# Patient Record
Sex: MALE | Race: WHITE | Employment: FULL TIME | URBAN - METROPOLITAN AREA
[De-identification: names, ages, dates, MRNs, and addresses within clinical notes are randomized per-mention and may not be internally consistent; named-entity substitution may affect disease eponyms.]

---

## 2022-09-08 ENCOUNTER — HOSPITAL ENCOUNTER (OUTPATIENT)
Dept: PHYSICAL THERAPY | Age: 42
Discharge: HOME | End: 2022-09-08
Payer: COMMERCIAL

## 2022-09-08 PROCEDURE — 97162 PT EVAL MOD COMPLEX 30 MIN: CPT

## 2022-09-08 NOTE — PROGRESS NOTES
[address] [place] [address] PHYSICAL THERAPY AT [facility] [address]. [place], [address] - Phone: [phone]  Fax: [phone] / [address]  Patient Name: Josemanuel Nieves : 1980   Medical   Diagnosis: Other low back pain [M54.59] Treatment Diagnosis: LBP   Onset Date: ~3/2022     Referral Source: Dorcas Mcgregor MD Start of Care [facility]): 2022   Prior Hospitalization: See medical history Provider #: [id number]   Prior Level of Function: Pt running, working without LBP   Comorbidities: See medical history   Medications: Verified on Patient Summary List     The Plan of Care and following information is based on the information from the initial evaluation.   ===========================================================================================  Assessment / key information:   Josemanuel Nieves is a 38 y.o.  yo male with Dx of Other low back pain [M54.59]. He currently rates LBP as 6/10 at worst, 1/10 at best, primarily located at lower lumbar region bilaterally. He complains of difficulty and increase pain with prolonged sitting, walking, bending. Denies numbness and tingling. Pt states his symptoms are most severe with unsupported sitting and discomfort is always there to some extent. Pt states he is working from home at a desk with two monitors, second monitor to the L. States he has been working this way since pandemic. Objective Findings:  Lumbar ROM: Limited AROM with pain during all lumbar AROM, pt unable to perform segmental motion in lumbar region with exception of L rotation. His flexion and ext are most painful motions. His B hip ER and IR are limited and painful at [facility] in lumbar region. TTP at R lumbar paraspinals which are hypertonic and hypomobility noted in lumbar intersegmental motion at Gr 2. Manual Muscle Testing: B hip ER, abd and ext at 4/5, abd R at 3+/5 L at 4/5.   Special Test:  Slump Test: inconclusive, SLR limited by hamstrings B, 90/90 testing R at -45 deg, L at 40 deg. Pt with significant discomfort with prone positioning, reduced symptoms with PPT in supine. Pt instructed in HEP, lumbar roll and body mechanics and will f/u in clinic for PT. Pt demonstrates signs and symptoms consistent with possible discogenic nature, would benefit from ergonomic follow up and Helio screen at current clinic.    ==================================================================================  Eval Complexity: History MEDIUM  Complexity : 1-2 comorbidities / personal factors will impact the outcome/ POC ;  Examination  LOW Complexity : 1-2 Standardized tests and measures addressing body structure, function, activity limitation and / or participation in recreation ; Presentation LOW Complexity : Stable, uncomplicated ;  Decision Making MEDIUM Complexity : FOTO score of 26-74; Overall Complexity LOW   Problem List: pain affecting function, decrease ROM, decrease strength, impaired gait/ balance, decrease ADL/ functional abilitiies, decrease activity tolerance, decrease flexibility/ joint mobility, and decrease transfer abilities   Treatment Plan may include any combination of the following: Therapeutic exercise, Therapeutic activities, Neuromuscular re-education, Physical agent/modality, Manual therapy, Patient education, Self Care training, Functional mobility training, Home safety training, and Stair training  Patient / Family readiness to learn indicated by: asking questions, trying to perform skills and interest  Persons(s) to be included in education: patient (P)  Barriers to Learning/Limitations: None  Measures taken, if barriers to learning:    Patient Goal (s): To have help with pain, learn exercises to improve pain     Rehabilitation Potential: good  Short Term Goals: To be accomplished in  2 weeks:  1. Independent with HEP for improved ROM, B hip strength to improve ability to perform ADLs.   2. Pt will be educated and verbalize understanding of body mechanics and posture during ADLs and work to improve pain with function. 3. Pt will demonstrate functional and nonpainful lumbar AROM to improve work and ADLs. Long Term Goals: To be accomplished in  4-6  weeks:  1. Pt will demonstrate ability to perform MMT for B hip and core at 4+/5 and fair+ core strength to improve recreational activity. 2.  Improve FOTO score to 78 to improve return to prior level of function, recreational activity without pain. 3.  Will rate a +5 on Global Rating of Change and be prepared to DC to HEP. Frequency / Duration:   Patient to be seen  1-2 times per week for 4-6 weeks:  Patient / Caregiver education and instruction: self care and exercises    Therapist Signature: Zuleyka Hammer PT Date: 5/8/7116   Certification Period: na Time: 1:31 PM   ===========================================================================================  I certify that the above Physical Therapy Services are being furnished while the patient is under my care. I agree with the treatment plan and certify that this therapy is necessary. Physician Signature:        Date:       Time:     Zacarias Gerardo MD    Please sign and return to [facility] or you may fax the signed copy to [phone]. Thank you.

## 2022-09-08 NOTE — PROGRESS NOTES
PHYSICAL THERAPY - DAILY TREATMENT NOTE    Patient Name: Domenic Garcia        Date: 2022  : 1980   YES Patient  Verified  Visit #:   1   of   4-6  Insurance: Payor: Shubham Ball / Plan: [place] [employer] P[place] / Product Type: PPO /      In time: 230 Out time: 320   Total Treatment Time: 50     Medicare Time Tracking (below)   Total Timed Codes (min):  -- 1:1 Treatment Time:  --     TREATMENT AREA =  Other low back pain [M54.59]    SUBJECTIVE  Pre visit pain level: 4/10                                                         See IE            OBJECTIVE        nc min Self Care: Pt ed regarding towel roll, sleep and seated posture   Rationale:     increase understanding of current symptoms and postural considerations  to improve the patients ability to sit, sleep with less pain    Billed With/As:   [] TE   [] TA   [] Neuro   [] Self Care Patient Education: [x] Review HEP    [] Progressed/Changed HEP based on:   [] positioning   [] body mechanics   [] transfers   [] heat/ice application    [] other:        thoroughout evaluation min Patient Education:  YES  Reviewed HEP   [x] A and P related to current DX [x] LTGs and POC     Other Objective/Functional Measures:  Issued HEP:PPT, figure 4 and clams. Issued handouts for posture and work station set up.   See IE      Post visit pain level: 3/10  ASSESSMENT    [x]  See Initial Evaluation     PLAN    []  Upgrade activities as tolerated YES Continue plan of care   []  Discharge due to :    []  Other: Pt will return for follow up and to initiate POC     Therapist: Natan Salmeron, PT, Beena DEVLIN    Date: 2022 Time: 1:30 PM

## 2022-09-13 ENCOUNTER — HOSPITAL ENCOUNTER (OUTPATIENT)
Dept: PHYSICAL THERAPY | Age: 42
End: 2022-09-13
Payer: COMMERCIAL

## 2022-09-20 ENCOUNTER — HOSPITAL ENCOUNTER (OUTPATIENT)
Dept: PHYSICAL THERAPY | Age: 42
Discharge: HOME | End: 2022-09-20
Payer: COMMERCIAL

## 2022-09-20 PROCEDURE — 97110 THERAPEUTIC EXERCISES: CPT

## 2022-09-20 PROCEDURE — 97530 THERAPEUTIC ACTIVITIES: CPT

## 2022-09-20 PROCEDURE — 97140 MANUAL THERAPY 1/> REGIONS: CPT

## 2022-09-20 PROCEDURE — 97112 NEUROMUSCULAR REEDUCATION: CPT

## 2022-09-20 NOTE — PROGRESS NOTES
PHYSICAL THERAPY - DAILY TREATMENT NOTE     Patient Name: Denny Corrigan        Date: 2022  : 1980   YES Patient  Verified  Visit #:   2   of   4-6  Insurance: Payor: Vena Duane / Plan: [employer] PPO / Product Type: PPO /      In time: 905 Out time: 943   Total Treatment Time: 38     Medicare/[facility] Time Tracking (below)   Total Timed Codes (min):   1:1 Treatment Time:       TREATMENT AREA =  Other low back pain [M54.59]    SUBJECTIVE    Pain Level (on 0 to 10 scale):  6  / 10   Medication Changes/New allergies or changes in medical history, any new surgeries or procedures? NO    If yes, update Summary List   Subjective Functional Status/Changes:  []  No changes reported     Sharp pain in morning. Stiff and feels like it might lock. Worse with unsupported sitting/ lift. No leg symptoms    X-ray results--normal         OBJECTIVE  8  8 min Manual Therapy: Technique:      [x] S/DTM []IASTM []PROM [] Passive Stretching   [x]manual TPR  [] SOR [] man traction  []Jt manipulation:Gr I [] II []  III [] IV[]   [x] OP with REIL    []   Treatment Area:  LS      *manual therapy interventions were performed at a separate and distinct time from   the therapeutic activities interventions.    Rationale:      decrease pain, increase ROM, increase tissue extensibility, decrease trigger points, and dec neural compromise  to improve patient's ability to perform ADLs with less pain    20 min Therapeutic Exercise:  [x]  See flow sheet   Rationale:      increase ROM and dec neural compromise  to improve the patients ability to perform ADLs       10 min Therapeutic Activity: [x]  See flow sheet   Rationale:      increase ROM, improve coordination, and dec neural compromise  to improve the patients ability to perform ADLs       Billed With/As:   [] TE   [] TA   [] Neuro   [] Self Care Patient Education: [x] Review HEP    [] Progressed/Changed HEP based on:   [] positioning   [] body mechanics   [] transfers   [] heat/ice application    [] other:        Other Objective/Functional Measures:    >50% limitation of Lx flex/ ext    Pt ed on disc mechanics/ pathology and ext principle    Pt ed on ergonomics/ sitting posture/ lifting mechanics for disc pathology   Post Treatment Pain Level (on 0 to 10) scale:   6  / 10     ASSESSMENT    Assessment/Changes in Function:     Progressed HEP/ activity modification per extension principle to r/o derangement     []  See Progress Note/Recertification   Patient will continue to benefit from skilled PT services to modify and progress therapeutic interventions, address functional mobility deficits, address ROM deficits, analyze and address soft tissue restrictions, analyze and cue movement patterns, and analyze and modify body mechanics/ergonomics to attain remaining goals. Progress toward goals / Updated goals:    Pt reports good understanding of ext principle and willingness to comply       []  See Progress Note/Recertification    PLAN    [x]  Upgrade activities as tolerated {YES) Continue plan of care   []  Discharge due to :    []  Other:      Therapist: Uzma Coffey PT    Date: 9/20/2022 Time: 9:05 AM   No future appointments.

## 2022-09-27 ENCOUNTER — APPOINTMENT (OUTPATIENT)
Dept: PHYSICAL THERAPY | Age: 42
End: 2022-09-27
Payer: COMMERCIAL

## 2022-10-04 ENCOUNTER — APPOINTMENT (OUTPATIENT)
Dept: PHYSICAL THERAPY | Age: 42
End: 2022-10-04

## 2022-10-31 ENCOUNTER — APPOINTMENT (OUTPATIENT)
Dept: PHYSICAL THERAPY | Age: 42
End: 2022-10-31

## 2022-11-18 NOTE — PROGRESS NOTES
[address] [place] [address] PHYSICAL THERAPY  [facility] 93. [place], [address]  Phone: [phone]  Fax: [phone]      DISCHARGE NOTE  Patient Name: Domenic Garcia : 1980   Treatment/Medical Diagnosis: Other low back pain [M54.59]   Referral Source: Destiny Ho MD     Date of Initial Visit: 22 Attended Visits: 2 Missed Visits: several       SUMMARY OF TREATMENT  Pt attended only initial evaluation and     1     follow-ups and then did not return. Therefore a formal reassessment of goals was not performed. RECOMMENDATIONS  Discontinue physical therapy due to patient not returning. If you have any questions/comments please contact us directly at [phone]. Thank you for allowing us to assist in the care of your patient.     Therapist Signature: Hermelinda Bueno PT Date: 22     Time: 1:01 PM